# Patient Record
Sex: MALE
[De-identification: names, ages, dates, MRNs, and addresses within clinical notes are randomized per-mention and may not be internally consistent; named-entity substitution may affect disease eponyms.]

---

## 2017-06-10 ENCOUNTER — HOSPITAL ENCOUNTER (EMERGENCY)
Dept: HOSPITAL 75 - ER | Age: 32
Discharge: HOME | End: 2017-06-10
Payer: SELF-PAY

## 2017-06-10 VITALS — HEIGHT: 64 IN | BODY MASS INDEX: 27.31 KG/M2 | WEIGHT: 160 LBS

## 2017-06-10 VITALS — DIASTOLIC BLOOD PRESSURE: 88 MMHG | SYSTOLIC BLOOD PRESSURE: 130 MMHG

## 2017-06-10 DIAGNOSIS — T16.1XXA: Primary | ICD-10-CM

## 2017-06-10 PROCEDURE — 99282 EMERGENCY DEPT VISIT SF MDM: CPT

## 2017-06-10 NOTE — ED EENT
History of Present Illness


General


Chief Complaint:  Foreign Body


Stated Complaint:  BUG IN EAR


Nursing Triage Note:  


PT STATES HE WAS OUTSIDE AND FELT A BUG FLY INTO HIS RIGHT EAR, HE CAN FEEL IT 


AND HEAR IT NOW. APPROX. 2200 TODAY.





History of Present Illness


Time seen by provider:  22:50


Initial Comments


patient states that he felt a bug fly into his right ear, he has been unable to 

remove it.


Timing/Duration:  abrupt


Location:  ear (R)


Prearrival Treatment:  no prearrival treatment


Associated Symptoms:  denies symptoms





Allergies and Home Medications


Allergies


Coded Allergies:  


     No Known Drug Allergies (Unverified , 6/10/17)





Home Medications


No Active Prescriptions or Reported Meds





Review of Systems


Constitutional:  no symptoms reported, see HPI


Eyes:  No Symptoms Reported, See HPI


Ears:  See HPI, Other (warm body right ear)


Nose:  no symptoms reported, see HPI


Mouth:  no symptoms reported, see HPI


Throat:  no symptoms reported, see HPI


Respiratory:  no symptoms reported, see HPI


Cardiovascular:  no symptoms reported, see HPI


Gastrointestinal:  no symptoms reported, see HPI


Musculoskeletal:  no symptoms reported, see HPI


Skin:  no symptoms reported, see HPI


Neurological:  No Symptoms Reported, See HPI


Hematologic/Lymphatic:  No Symptoms Reported, See HPI


Immunological/Allergic:  no symptoms reported, see HPI


All Other Systems Reviewed


Negative Unless Noted:  Yes





Past Medical-Social-Family Hx


Patient Social History


Alcohol Use:  Denies Use


Recreational Drug Use:  No


Smoking Status:  Never a Smoker


2nd Hand Smoke Exposure:  No


Recent Foreign Travel:  No


Contact w/Someone Who Travel:  No


Recent Infectious Disease Expo:  No


Recent Hopitalizations:  No





Immunizations Up To Date


PED Vaccines UTD:  No





Seasonal Allergies


Seasonal Allergies:  No





Physical Exam


Vital Signs





Vital Sign - Last 12Hours








 6/10/17





 22:43


 


Temp 98.3


 


Pulse 71


 


Resp 20


 


B/P (MAP) 130/88


 


Pulse Ox 97


 


O2 Delivery Room Air








General Appearance:  WD/WN, no apparent distress


Eyes:  bilateral eye EOMI, bilateral eye PERRL, bilateral eye normal inspection


Ears:  right ear canal normal (insect noted with impacted cerumen anterior canal

), bilateral ear TM normal, bilateral ear auricle normal


Nose:  normal inspection, No active bleeding, No discharge


Mouth/Throat:  normal mouth inspection, pharynx normal


Cardiovascular:  normal peripheral pulses, regular rate, rhythm, no murmur


Neurologic/Psychiatric:  no motor/sensory deficits, alert, normal mood/affect, 

oriented x 3





Progress/Results/Core Measures


Results/Orders


Vital Signs/I&O





Vital Sign - Last 12Hours








 6/10/17





 22:43


 


Temp 98.3


 


Pulse 71


 


Resp 20


 


B/P (MAP) 130/88


 


Pulse Ox 97


 


O2 Delivery Room Air














Blood Pressure Mean:  102








Progress Note :  


   Time:  22:50


Progress Note


insect visualized in the right anterior ear canal, impacted in cerumen.


Irrigated with normal saline, foreign body still in place. Ear curet used, part 

of all insect removed. Small amount of insect still intact in the cerumen. 

using fine-tipped suction, able to remove the remainder of the insect and some 

cerumen. The ear canal was irrigated with 30 mils of peroxide. The patient 

tolerated the procedure well he did note slight decreased hearing in the right 

ear compared to the left.





Departure


Impression


Impression:  


 Primary Impression:  


 Foreign body of ear, right


 Qualified Codes:  T16.1XXA - Foreign body in right ear, initial encounter


Disposition:  01 HOME, SELF-CARE


Condition:  Improved





Departure-Patient Inst.


Decision time for Depature:  23:10


Patient Instructions:  Ear Wax Impaction (DC), Foreign Body in Ear, Child





Add. Discharge Instructions:  


clean right ear with peroxide 4 times a day.


return to emergency department for ear pain, decreased hearing, or new problems.


All discharge instructions reviewed with patient and/or family. Voiced 

understanding.


Scripts


No Active Prescriptions or Reported Meds











ADAM MARR Beto 10, 2017 23:13

## 2018-09-26 ENCOUNTER — HOSPITAL ENCOUNTER (EMERGENCY)
Dept: HOSPITAL 75 - ER | Age: 33
Discharge: HOME | End: 2018-09-26
Payer: SELF-PAY

## 2018-09-26 VITALS — DIASTOLIC BLOOD PRESSURE: 74 MMHG | SYSTOLIC BLOOD PRESSURE: 132 MMHG

## 2018-09-26 VITALS — WEIGHT: 160 LBS | HEIGHT: 64 IN | BODY MASS INDEX: 27.31 KG/M2

## 2018-09-26 DIAGNOSIS — S33.5XXA: Primary | ICD-10-CM
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
## 2018-09-26 PROCEDURE — 72131 CT LUMBAR SPINE W/O DYE: CPT

## 2018-09-26 NOTE — DIAGNOSTIC IMAGING REPORT
PROCEDURE: CT lumbar spine without contrast.



TECHNIQUE: Multiple contiguous axial images were obtained through

the lumbar spine without the use of intravenous contrast.

Sagittal and coronal reformations were then performed.



INDICATION:  Lower lumbar pain. Previous fall.



COMPARISON: None



FINDINGS: For the purposes of this exam, last well-formed disc

space is denoted the L5-S1 level. Static alignment is maintained.

There is no significant anterolisthesis or retrolisthesis. There

is no evidence of jumped facets.



Vertebral body heights are maintained as well. There is no

evidence of acute fracture. No bony fragments are seen within the

spinal canal. Intervertebral disc heights are fairly

well-maintained. Evaluation of spinal canal is suboptimal

secondary to CT modality and lack of intrathecal contrast, but no

gross filling defects are identified within the spinal canal.

Pre-and paravertebral soft tissue structures are unremarkable.



IMPRESSION:

1. Unremarkable CT of the lumbar spine.



Dictated by: 



  Dictated on workstation # [id number]

## 2018-09-26 NOTE — ED BACK PAIN
General


Chief Complaint:  Back Problems


Stated Complaint:  BACK INJ


Nursing Triage Note:  


pt states he injured his back two years ago. yesterday was doing lawn work with 


a leaf blower and aggravated his back. reporting middle and lower back pain.


Nursing Sepsis Screen:  No Definite Risk


Source of Information:  Patient


Exam Limitations:  No Limitations





History of Present Illness


Date Seen by Provider:  Sep 26, 2018


Time Seen by Provider:  11:45


Initial Comments


Patient is a 33-year-old male who presents to the emergency room with 

complaints of lower left back pain. He reports that 2 years ago he injured his 

back and has been having flareups ever since but it became worse yesterday when 

he was trying to start his leaf blower and pulling on the string he felt a pop 

in his lower back and has been experiencing pain ever since. He denies loss of 

bowel or bladder or saddle paresthesia.


Location:  Lumbar Spine


Timing/Duration:  1-2 Days


Pain/Injury Location:  Back


Associated Symptoms:  No numbness in legs/feet, No tingling in legs/feet; lower 

back pain; No loss of bladder control, No loss of bowel control





Allergies and Home Medications


Allergies


Coded Allergies:  


     No Known Drug Allergies (Unverified , 6/10/17)





Home Medications


Cyclobenzaprine HCl 10 Mg Tablet, 10 MG PO Q8H


   Prescribed by: KISHA MARTINEZ on 18 1330


Prednisone 20 Mg Tab, 40 MG PO DAILY


   Prescribed by: KISHA MARTINEZ on 18 1330





Patient Home Medication List


Home Medication List Reviewed:  Yes





Review of Systems


Constitutional:  see HPI; No chills, No fever


Musculoskeletal:  see HPI, back pain





All Other Systems Reviewed


Negative Unless Noted:  Yes





Past Medical-Social-Family Hx


Past Med/Social Hx:  Reviewed Nursing Past Med/Soc Hx


Patient Social History


2nd Hand Smoke Exposure:  No


Recent Foreign Travel:  No


Contact w/Someone Who Travel:  No


Recent Infectious Disease Expo:  No


Recent Hopitalizations:  No





Immunizations Up To Date


PED Vaccines UTD:  No





Seasonal Allergies


Seasonal Allergies:  No





Past Medical History


Surgeries:  No


Respiratory:  No


Cardiac:  No


Neurological:  No


Genitourinary:  No


Gastrointestinal:  No


Musculoskeletal:  No


Endocrine:  No


HEENT:  No


Cancer:  No


Psychosocial:  No


Integumentary:  No


Blood Disorders:  No





Family Medical History


Reviewed Nursing Family Hx





Physical Exam


Vital Signs





Vital Signs - First Documented








 18





 11:28


 


Temp 98.2


 


Pulse 65


 


Resp 20


 


B/P (MAP) 132/74 (93)


 


Pulse Ox 99


 


O2 Delivery Room Air





Capillary Refill : Less Than 3 Seconds


Height, Weight, BMI


Height: 5'4.00"


Weight: 160lbs. oz. 72.850524lt;  BMI


Method:Stated


General Appearance:  No Apparent Distress, WD/WN


Neck:  Full Range of Motion, Normal Inspection, Non Tender, Supple


Cardiovascular:  Regular Rate, Rhythm, No Edema, No Gallop, No JVD, No Murmur, 

Normal Peripheral Pulses


Respiratory:  Chest Non Tender, Lungs Clear, Normal Breath Sounds, No Accessory 

Muscle Use, No Respiratory Distress, Accessory Muscle Use


Gastrointestinal:  Normal Bowel Sounds, No Organomegaly, No Pulsatile Mass, Non 

Tender, Soft


Back:  Normal Inspection, No CVA Tenderness


Neurologic/Psychiatric:  Alert, Oriented x3, Normal Mood/Affect


Skin:  Normal Color, Warm/Dry





Progress/Results/Core Measures


Results/Orders


My Orders





Orders - KISHA MARTINEZ


Ketorolac Injection (Toradol Injection) (18 12:00)


Orphenadrine Injection (Norflex Injectio (18 12:00)


Ct Lumbar Spine Wo (18 11:58)





Medications Given in ED





Vital Signs/I&O











 18





 11:28 13:34


 


Temp 98.2 98.2


 


Pulse 65 65


 


Resp 20 20


 


B/P (MAP) 132/74 (93) 132/74 (93)


 


Pulse Ox 99 99


 


O2 Delivery Room Air 














Blood Pressure Mean:  93











Progress


Progress Note :  


   Time:  13:25


Progress Note


I have seen and evaluated the patient. He is having a relief of pain at this 

time. He was informed of normal imaging studies. He agrees with plan for 

discharge, close follow up with PCP. Return precautions were given.





Diagnostic Imaging





   Diagonstic Imaging:  CT


   Plain Films/CT/US/NM/MRI:  other (lumbar spine)


Comments





NAME:      GAETANO TODD


[facility] REC#:   [id number]


ACCOUNT#:   [id number]


PHYSICIAN:    KISHA MARTINEZ


 








CC:   KISHA MARTINEZ; RAMÍREZ SOFIA MD


 Page 2 of 2


 RADIOLOGY REPORT





 [facility] [place], [place]





CC:   KISHA MARTINEZ; RAMÍREZ SOFIA MD


 Page 1 of 1


 RADIOLOGY REPORT





NAME:      GAETANO TODD


[facility] REC#:   [id number]


ACCOUNT#:   [id number]


PT STATUS:   DEP ER


:      1985


PHYSICIAN:    KISHA MARTINEZ


ADMIT DATE:   18/ER


 ***Signed***


Date of Exam:   18





CT LUMBAR SPINE WO


 





PROCEDURE: CT lumbar spine without contrast.





TECHNIQUE: Multiple contiguous axial images were obtained through


the lumbar spine without the use of intravenous contrast.


Sagittal and coronal reformations were then performed.





INDICATION:  Lower lumbar pain. Previous fall.





COMPARISON: None





FINDINGS: For the purposes of this exam, last well-formed disc


space is denoted the L5-S1 level. Static alignment is maintained.


There is no significant anterolisthesis or retrolisthesis. There


is no evidence of jumped facets.





Vertebral body heights are maintained as well. There is no


evidence of acute fracture. No bony fragments are seen within the


spinal canal. Intervertebral disc heights are fairly


well-maintained. Evaluation of spinal canal is suboptimal


secondary to CT modality and lack of intrathecal contrast, but no


gross filling defects are identified within the spinal canal.


Pre-and paravertebral soft tissue structures are unremarkable.





IMPRESSION:


1. Unremarkable CT of the lumbar spine.





Dictated by: 





  Dictated on workstation # [id number]





[id number]





Dict:      18 1307


Trans:      18 1705





Interpreted by:         RAMÍREZ SOFIA MD


Electronically signed by:   RAMÍREZ SOFIA MD 18 1705


   Reviewed:  Reviewed by Me





Departure


Impression





 Primary Impression:  


 Lumbar back sprain


Disposition:  01 HOME, SELF-CARE


Condition:  Stable/Unchanged





Departure-Patient Inst.


Decision time for Depature:  13:28


Referrals:  


SIMRAN GONZALEZ DO


Patient Instructions:  Lumbar Muscle Strain (DC), Low Back Pain  (DC)





Add. Discharge Instructions:  


Take medications as directed. You may use ibuprofen and Tylenol as directed by 

the bottle for pain relief. [facility] within 1 week for 

recheck. Return back to the emergency room for any worsening pain, numbness or 

tingling, loss of bowel or bladder, or any worsening symptoms or concerns as 

needed. All discharge instructions reviewed with patient and/or family. Voiced 

understanding.


Scripts


Prednisone (Prednisone) 20 Mg Tab


40 MG PO DAILY for 3 Days, #6 TAB


   Prov: KISHA MARTINEZ         18 


Cyclobenzaprine HCl (Cyclobenzaprine HCl) 10 Mg Tablet


10 MG PO Q8H for muscle spasms, #14 TAB


   Prov: KISHA MARTINEZ         18











KIHSA MARTINEZ Sep 26, 2018 12:03